# Patient Record
Sex: FEMALE | Race: BLACK OR AFRICAN AMERICAN | NOT HISPANIC OR LATINO | Employment: UNEMPLOYED | URBAN - METROPOLITAN AREA
[De-identification: names, ages, dates, MRNs, and addresses within clinical notes are randomized per-mention and may not be internally consistent; named-entity substitution may affect disease eponyms.]

---

## 2019-12-18 PROBLEM — Z87.39 HISTORY OF CHRONIC BACK PAIN: Status: ACTIVE | Noted: 2019-12-18

## 2019-12-18 PROBLEM — R46.89 AGGRESSIVE BEHAVIOR: Status: ACTIVE | Noted: 2019-12-18

## 2019-12-18 PROBLEM — R45.6 VIOLENT BEHAVIOR: Status: ACTIVE | Noted: 2017-03-29

## 2019-12-18 PROBLEM — E03.4 HYPOTHYROIDISM DUE TO ACQUIRED ATROPHY OF THYROID: Status: ACTIVE | Noted: 2019-12-18

## 2019-12-18 PROBLEM — E78.2 MIXED HYPERLIPIDEMIA: Status: ACTIVE | Noted: 2017-10-16

## 2019-12-18 PROBLEM — F32.9 MAJOR DEPRESSIVE DISORDER: Status: ACTIVE | Noted: 2017-06-13

## 2019-12-18 PROBLEM — F19.959: Status: ACTIVE | Noted: 2019-12-18

## 2019-12-18 PROBLEM — E87.6 HYPOKALEMIA: Status: ACTIVE | Noted: 2019-12-18

## 2019-12-18 PROBLEM — F63.81 INTERMITTENT EXPLOSIVE DISORDER IN ADULT: Status: ACTIVE | Noted: 2019-12-18

## 2019-12-18 PROBLEM — F19.10 POLYSUBSTANCE ABUSE: Status: ACTIVE | Noted: 2017-03-04

## 2021-04-28 PROBLEM — R52 PAIN AGGRAVATED BY WALKING: Status: ACTIVE | Noted: 2021-04-28

## 2021-04-28 PROBLEM — M25.562 CHRONIC PAIN OF LEFT KNEE: Status: ACTIVE | Noted: 2021-04-28

## 2021-04-28 PROBLEM — D16.22 OSTEOCHONDROMA OF FEMUR, LEFT: Status: ACTIVE | Noted: 2021-04-28

## 2021-04-28 PROBLEM — G89.29 CHRONIC PAIN OF LEFT KNEE: Status: ACTIVE | Noted: 2021-04-28

## 2021-05-05 PROBLEM — J96.01 ACUTE RESPIRATORY FAILURE WITH HYPOXIA AND HYPERCARBIA: Status: ACTIVE | Noted: 2021-05-05

## 2021-05-05 PROBLEM — J96.02 ACUTE RESPIRATORY FAILURE WITH HYPOXIA AND HYPERCARBIA: Status: ACTIVE | Noted: 2021-05-05

## 2021-05-05 PROBLEM — J44.1 COPD EXACERBATION: Status: ACTIVE | Noted: 2021-05-05

## 2021-05-05 PROBLEM — J18.9 PNEUMONIA: Status: ACTIVE | Noted: 2021-05-05

## 2021-05-05 PROBLEM — R40.0 SOMNOLENCE: Status: ACTIVE | Noted: 2021-05-05

## 2021-05-05 PROBLEM — J96.00 ACUTE RESPIRATORY FAILURE: Status: ACTIVE | Noted: 2021-05-05

## 2021-05-06 PROBLEM — J96.00 ACUTE RESPIRATORY FAILURE: Status: RESOLVED | Noted: 2021-05-05 | Resolved: 2021-05-06

## 2021-05-07 ENCOUNTER — PATIENT OUTREACH (OUTPATIENT)
Dept: ADMINISTRATIVE | Facility: CLINIC | Age: 51
End: 2021-05-07

## 2021-07-22 ENCOUNTER — PATIENT OUTREACH (OUTPATIENT)
Dept: ADMINISTRATIVE | Facility: HOSPITAL | Age: 51
End: 2021-07-22

## 2022-07-02 PROBLEM — F19.929 INTOXICATION BY DRUG: Status: ACTIVE | Noted: 2022-07-02
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
## 2022-07-02 PROBLEM — S82.141A TIBIAL PLATEAU FRACTURE, RIGHT: Status: ACTIVE | Noted: 2022-07-02

## 2022-07-03 PROBLEM — F16.20: Status: ACTIVE | Noted: 2022-07-03
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
## 2022-07-04 PROBLEM — S82.141A TIBIAL PLATEAU FRACTURE, RIGHT: Chronic | Status: ACTIVE | Noted: 2022-07-02

## 2022-07-04 PROBLEM — F19.929 INTOXICATION BY DRUG: Chronic | Status: ACTIVE | Noted: 2022-07-02
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
## 2022-07-11 PROBLEM — F19.929 INTOXICATION BY DRUG: Chronic | Status: RESOLVED | Noted: 2022-07-02 | Resolved: 2022-07-11

## 2022-07-11 PROBLEM — S82.141A TIBIAL PLATEAU FRACTURE, RIGHT: Chronic | Status: RESOLVED | Noted: 2022-07-02 | Resolved: 2022-07-11

## 2022-11-26 PROBLEM — F16.10 PCP (PHENCYCLIDINE) ABUSE: Status: ACTIVE | Noted: 2022-11-26

## 2022-11-26 PROBLEM — J96.02 ACUTE HYPERCAPNIC RESPIRATORY FAILURE: Status: ACTIVE | Noted: 2022-11-26

## 2022-11-26 PROBLEM — T50.904A DRUG OVERDOSE OF UNDETERMINED INTENT: Status: ACTIVE | Noted: 2022-11-26

## 2022-11-26 PROBLEM — G93.41 ENCEPHALOPATHY, METABOLIC: Status: ACTIVE | Noted: 2022-11-26

## 2022-11-26 PROBLEM — F14.929: Status: ACTIVE | Noted: 2022-11-26

## 2022-11-26 PROBLEM — I21.4 NSTEMI (NON-ST ELEVATED MYOCARDIAL INFARCTION): Status: ACTIVE | Noted: 2022-11-26

## 2022-11-26 PROBLEM — E87.20 LACTIC ACIDOSIS: Status: ACTIVE | Noted: 2022-11-26

## 2022-11-26 PROBLEM — J96.01 ACUTE RESPIRATORY FAILURE WITH HYPOXIA AND HYPERCARBIA: Status: ACTIVE | Noted: 2022-11-26

## 2022-11-26 PROBLEM — G92.9 ENCEPHALOPATHY, TOXIC: Status: ACTIVE | Noted: 2022-11-26

## 2022-11-26 PROBLEM — A41.9 SEPSIS: Status: ACTIVE | Noted: 2022-11-26

## 2022-11-27 PROBLEM — T17.500A MUCUS PLUGGING OF BRONCHI: Status: ACTIVE | Noted: 2022-11-27

## 2022-11-27 PROBLEM — J18.9 PNEUMONIA OF LEFT LOWER LOBE DUE TO INFECTIOUS ORGANISM: Status: ACTIVE | Noted: 2022-11-27

## 2022-11-29 PROBLEM — E87.6 HYPOKALEMIA: Status: ACTIVE | Noted: 2022-11-29

## 2022-11-30 PROBLEM — E03.8 OTHER SPECIFIED HYPOTHYROIDISM: Status: ACTIVE | Noted: 2022-11-30

## 2022-11-30 PROBLEM — J18.9 PNEUMONIA OF LEFT LOWER LOBE DUE TO INFECTIOUS ORGANISM: Status: RESOLVED | Noted: 2022-11-27 | Resolved: 2022-11-30

## 2022-11-30 PROBLEM — E87.20 LACTIC ACIDOSIS: Status: RESOLVED | Noted: 2022-11-26 | Resolved: 2022-11-30

## 2022-11-30 PROBLEM — I21.4 NSTEMI (NON-ST ELEVATED MYOCARDIAL INFARCTION): Status: RESOLVED | Noted: 2022-11-26 | Resolved: 2022-11-30

## 2022-11-30 PROBLEM — E87.6 HYPOKALEMIA: Status: RESOLVED | Noted: 2022-11-29 | Resolved: 2022-11-30

## 2022-11-30 PROBLEM — I10 PRIMARY HYPERTENSION: Status: ACTIVE | Noted: 2022-11-30

## 2022-11-30 PROBLEM — T50.904A DRUG OVERDOSE OF UNDETERMINED INTENT: Status: RESOLVED | Noted: 2022-11-26 | Resolved: 2022-11-30

## 2022-11-30 PROBLEM — J96.02 ACUTE RESPIRATORY FAILURE WITH HYPOXIA AND HYPERCARBIA: Status: RESOLVED | Noted: 2022-11-26 | Resolved: 2022-11-30

## 2022-11-30 PROBLEM — J96.01 ACUTE RESPIRATORY FAILURE WITH HYPOXIA AND HYPERCARBIA: Status: RESOLVED | Noted: 2022-11-26 | Resolved: 2022-11-30

## 2022-11-30 PROBLEM — G93.41 ENCEPHALOPATHY, METABOLIC: Status: RESOLVED | Noted: 2022-11-26 | Resolved: 2022-11-30

## 2022-11-30 PROBLEM — G92.9 ENCEPHALOPATHY, TOXIC: Status: RESOLVED | Noted: 2022-11-26 | Resolved: 2022-11-30

## 2022-11-30 PROBLEM — T17.500A MUCUS PLUGGING OF BRONCHI: Status: RESOLVED | Noted: 2022-11-27 | Resolved: 2022-11-30

## 2022-11-30 PROBLEM — A41.9 SEPSIS: Status: RESOLVED | Noted: 2022-11-26 | Resolved: 2022-11-30

## 2023-03-14 PROBLEM — J13 PNEUMONIA DUE TO STREPTOCOCCUS PNEUMONIAE: Status: ACTIVE | Noted: 2023-03-14

## 2023-03-18 PROBLEM — K52.1 DIARRHEA DUE TO DRUG: Status: RESOLVED | Noted: 2023-03-18 | Resolved: 2023-03-18

## 2023-03-18 PROBLEM — K52.1 DIARRHEA DUE TO DRUG: Status: ACTIVE | Noted: 2023-03-18

## 2023-03-30 PROBLEM — M79.604 RIGHT LEG PAIN: Status: ACTIVE | Noted: 2023-03-30

## 2023-04-02 PROBLEM — J44.1 COPD EXACERBATION: Status: RESOLVED | Noted: 2021-05-05 | Resolved: 2023-04-02

## 2023-04-02 PROBLEM — R40.0 SOMNOLENCE: Status: RESOLVED | Noted: 2021-05-05 | Resolved: 2023-04-02

## 2023-04-02 PROBLEM — J44.1 CHRONIC OBSTRUCTIVE PULMONARY DISEASE WITH ACUTE EXACERBATION: Status: ACTIVE | Noted: 2023-04-02

## 2023-04-02 PROBLEM — J96.02 ACUTE RESPIRATORY FAILURE WITH HYPOXIA AND HYPERCARBIA: Status: RESOLVED | Noted: 2021-05-05 | Resolved: 2023-04-02

## 2023-04-02 PROBLEM — J96.01 ACUTE RESPIRATORY FAILURE WITH HYPOXIA AND HYPERCARBIA: Status: RESOLVED | Noted: 2021-05-05 | Resolved: 2023-04-02

## 2023-04-03 ENCOUNTER — PATIENT OUTREACH (OUTPATIENT)
Dept: ADMINISTRATIVE | Facility: CLINIC | Age: 53
End: 2023-04-03
Payer: MEDICAID

## 2023-04-03 NOTE — PROGRESS NOTES
C3 nurse attempted to contact Ida Bowden  for a TCC post hospital discharge follow up call. No answer. The patient does not have a scheduled HOSFU appointment, and the pt does not have an [facility] PCP.

## 2023-04-04 NOTE — PROGRESS NOTES
C3 nurse spoke with Ida Bowden ( mother)  for a TCC post hospital discharge follow up call. The patient has a scheduled HOSFU appointment with MIMA Acuña on 04/27/2023 @ 1400.

## 2023-06-19 PROBLEM — J13 PNEUMONIA DUE TO STREPTOCOCCUS PNEUMONIAE: Status: RESOLVED | Noted: 2023-03-14 | Resolved: 2023-06-19

## 2023-10-02 DIAGNOSIS — Z12.31 OTHER SCREENING MAMMOGRAM: ICD-10-CM

## 2024-10-03 PROBLEM — F19.959: Status: RESOLVED | Noted: 2019-12-18 | Resolved: 2024-10-03

## 2024-10-03 PROBLEM — E87.6 HYPOKALEMIA: Status: RESOLVED | Noted: 2019-12-18 | Resolved: 2024-10-03

## 2024-10-03 PROBLEM — F16.10 PCP (PHENCYCLIDINE) ABUSE: Status: RESOLVED | Noted: 2022-11-26 | Resolved: 2024-10-03

## 2024-10-03 PROBLEM — J18.9 PNEUMONIA: Status: RESOLVED | Noted: 2021-05-05 | Resolved: 2024-10-03

## 2024-10-03 PROBLEM — R45.6 VIOLENT BEHAVIOR: Status: RESOLVED | Noted: 2017-03-29 | Resolved: 2024-10-03

## 2024-11-03 PROBLEM — J96.22 ACUTE ON CHRONIC RESPIRATORY FAILURE WITH HYPOXIA AND HYPERCAPNIA: Status: ACTIVE | Noted: 2024-11-03

## 2024-11-03 PROBLEM — F17.200 TOBACCO DEPENDENCY: Status: ACTIVE | Noted: 2024-11-03

## 2024-11-03 PROBLEM — J96.21 ACUTE ON CHRONIC RESPIRATORY FAILURE WITH HYPOXIA AND HYPERCAPNIA: Status: ACTIVE | Noted: 2024-11-03

## 2024-11-03 PROBLEM — R41.82 ALTERED MENTAL STATE: Status: ACTIVE | Noted: 2024-11-03

## 2024-11-04 ENCOUNTER — PATIENT OUTREACH (OUTPATIENT)
Dept: ADMINISTRATIVE | Facility: HOSPITAL | Age: 54
End: 2024-11-04
Payer: MEDICAID

## 2024-11-04 PROBLEM — I50.33 ACUTE ON CHRONIC DIASTOLIC CONGESTIVE HEART FAILURE: Status: ACTIVE | Noted: 2024-11-04

## 2024-11-04 PROBLEM — R73.09 ELEVATED GLUCOSE LEVEL: Status: ACTIVE | Noted: 2024-11-04

## 2024-11-04 PROBLEM — N17.9 AKI (ACUTE KIDNEY INJURY): Status: ACTIVE | Noted: 2024-11-04

## 2024-11-05 PROBLEM — M25.512 BILATERAL SHOULDER PAIN: Status: ACTIVE | Noted: 2024-11-05

## 2024-11-05 PROBLEM — M25.511 BILATERAL SHOULDER PAIN: Status: ACTIVE | Noted: 2024-11-05

## 2024-11-05 PROBLEM — R41.82 ALTERED MENTAL STATE: Status: RESOLVED | Noted: 2024-11-03 | Resolved: 2024-11-05

## 2024-11-06 PROBLEM — J96.21 ACUTE ON CHRONIC RESPIRATORY FAILURE WITH HYPOXIA AND HYPERCAPNIA: Status: RESOLVED | Noted: 2024-11-03 | Resolved: 2024-11-06

## 2024-11-06 PROBLEM — J96.22 ACUTE ON CHRONIC RESPIRATORY FAILURE WITH HYPOXIA AND HYPERCAPNIA: Status: RESOLVED | Noted: 2024-11-03 | Resolved: 2024-11-06

## 2024-11-06 PROBLEM — N17.9 AKI (ACUTE KIDNEY INJURY): Status: RESOLVED | Noted: 2024-11-04 | Resolved: 2024-11-06

## 2024-11-06 PROBLEM — J44.1 COPD WITH ACUTE EXACERBATION: Status: RESOLVED | Noted: 2023-04-02 | Resolved: 2024-11-06

## 2024-11-13 ENCOUNTER — PATIENT OUTREACH (OUTPATIENT)
Dept: ADMINISTRATIVE | Facility: HOSPITAL | Age: 54
End: 2024-11-13
Payer: MEDICAID

## 2024-11-13 DIAGNOSIS — E09.44: Primary | ICD-10-CM

## 2024-11-13 DIAGNOSIS — Z12.31 BREAST CANCER SCREENING BY MAMMOGRAM: ICD-10-CM

## 2025-01-13 ENCOUNTER — PATIENT OUTREACH (OUTPATIENT)
Dept: ADMINISTRATIVE | Facility: HOSPITAL | Age: 55
End: 2025-01-13
Payer: MEDICAID

## 2025-01-13 DIAGNOSIS — Z12.31 BREAST CANCER SCREENING BY MAMMOGRAM: ICD-10-CM

## 2025-01-13 DIAGNOSIS — E09.44: Primary | ICD-10-CM

## 2025-01-14 LAB
ALBUMIN/CREAT UR: NORMAL
CREAT UR-MCNC: 23 MG/DL (ref 15–325)
MICROALBUMIN UR-MCNC: <5 UG/ML

## 2025-04-02 ENCOUNTER — PATIENT OUTREACH (OUTPATIENT)
Dept: ADMINISTRATIVE | Facility: HOSPITAL | Age: 55
End: 2025-04-02
Payer: MEDICAID

## 2025-06-18 ENCOUNTER — PATIENT OUTREACH (OUTPATIENT)
Dept: ADMINISTRATIVE | Facility: HOSPITAL | Age: 55
End: 2025-06-18
Payer: MEDICAID

## 2025-07-15 ENCOUNTER — PATIENT OUTREACH (OUTPATIENT)
Dept: ADMINISTRATIVE | Facility: HOSPITAL | Age: 55
End: 2025-07-15
Payer: MEDICAID